# Patient Record
Sex: FEMALE | URBAN - METROPOLITAN AREA
[De-identification: names, ages, dates, MRNs, and addresses within clinical notes are randomized per-mention and may not be internally consistent; named-entity substitution may affect disease eponyms.]

---

## 2020-08-12 ENCOUNTER — INPATIENT (INPATIENT)
Facility: HOSPITAL | Age: 70
LOS: 0 days | Discharge: HOME | DRG: 286 | End: 2020-08-12
Attending: INTERNAL MEDICINE | Admitting: INTERNAL MEDICINE
Payer: COMMERCIAL

## 2020-08-12 VITALS
RESPIRATION RATE: 17 BRPM | OXYGEN SATURATION: 97 % | SYSTOLIC BLOOD PRESSURE: 133 MMHG | HEART RATE: 96 BPM | DIASTOLIC BLOOD PRESSURE: 72 MMHG

## 2020-08-12 VITALS
SYSTOLIC BLOOD PRESSURE: 138 MMHG | TEMPERATURE: 98 F | HEART RATE: 68 BPM | HEIGHT: 60 IN | DIASTOLIC BLOOD PRESSURE: 68 MMHG | RESPIRATION RATE: 17 BRPM | OXYGEN SATURATION: 98 % | WEIGHT: 147.05 LBS

## 2020-08-12 DIAGNOSIS — R07.9 CHEST PAIN, UNSPECIFIED: ICD-10-CM

## 2020-08-12 DIAGNOSIS — E78.5 HYPERLIPIDEMIA, UNSPECIFIED: ICD-10-CM

## 2020-08-12 PROCEDURE — 93010 ELECTROCARDIOGRAM REPORT: CPT

## 2020-08-12 PROCEDURE — C1769: CPT

## 2020-08-12 PROCEDURE — 93458 L HRT ARTERY/VENTRICLE ANGIO: CPT

## 2020-08-12 PROCEDURE — C1887: CPT

## 2020-08-12 PROCEDURE — 93005 ELECTROCARDIOGRAM TRACING: CPT

## 2020-08-12 PROCEDURE — C1894: CPT

## 2020-08-12 PROCEDURE — 93458 L HRT ARTERY/VENTRICLE ANGIO: CPT | Mod: 26

## 2020-08-12 RX ORDER — AMLODIPINE BESYLATE 2.5 MG/1
1 TABLET ORAL
Qty: 0 | Refills: 0 | DISCHARGE

## 2020-08-12 RX ORDER — FLUTICASONE PROPIONATE 50 MCG
1 SPRAY, SUSPENSION NASAL
Qty: 0 | Refills: 0 | DISCHARGE

## 2020-08-12 RX ORDER — AMLODIPINE BESYLATE 2.5 MG/1
1 TABLET ORAL
Qty: 30 | Refills: 0
Start: 2020-08-12 | End: 2020-09-10

## 2020-08-12 RX ORDER — ATORVASTATIN CALCIUM 80 MG/1
1 TABLET, FILM COATED ORAL
Qty: 0 | Refills: 0 | DISCHARGE

## 2020-08-12 RX ORDER — ASPIRIN/CALCIUM CARB/MAGNESIUM 324 MG
81 TABLET ORAL DAILY
Refills: 0 | Status: DISCONTINUED | OUTPATIENT
Start: 2020-08-12 | End: 2020-08-12

## 2020-08-12 RX ORDER — ATORVASTATIN CALCIUM 80 MG/1
40 TABLET, FILM COATED ORAL AT BEDTIME
Refills: 0 | Status: DISCONTINUED | OUTPATIENT
Start: 2020-08-12 | End: 2020-08-12

## 2020-08-12 RX ORDER — CLOPIDOGREL BISULFATE 75 MG/1
1 TABLET, FILM COATED ORAL
Qty: 0 | Refills: 0 | DISCHARGE

## 2020-08-12 RX ORDER — MECLIZINE HCL 12.5 MG
12.5 TABLET ORAL THREE TIMES A DAY
Refills: 0 | Status: DISCONTINUED | OUTPATIENT
Start: 2020-08-12 | End: 2020-08-12

## 2020-08-12 RX ORDER — DIPHENHYDRAMINE HCL 50 MG
1 CAPSULE ORAL
Qty: 0 | Refills: 0 | DISCHARGE

## 2020-08-12 RX ORDER — ASPIRIN/CALCIUM CARB/MAGNESIUM 324 MG
81 TABLET ORAL
Qty: 0 | Refills: 0 | DISCHARGE

## 2020-08-12 RX ORDER — ENOXAPARIN SODIUM 100 MG/ML
40 INJECTION SUBCUTANEOUS
Qty: 0 | Refills: 0 | DISCHARGE

## 2020-08-12 RX ORDER — MECLIZINE HCL 12.5 MG
1 TABLET ORAL
Qty: 0 | Refills: 0 | DISCHARGE

## 2020-08-12 RX ORDER — AMLODIPINE BESYLATE 2.5 MG/1
10 TABLET ORAL DAILY
Refills: 0 | Status: DISCONTINUED | OUTPATIENT
Start: 2020-08-12 | End: 2020-08-12

## 2020-08-12 RX ORDER — ATORVASTATIN CALCIUM 80 MG/1
1 TABLET, FILM COATED ORAL
Qty: 30 | Refills: 0
Start: 2020-08-12 | End: 2020-09-10

## 2020-08-12 RX ORDER — ASPIRIN/CALCIUM CARB/MAGNESIUM 324 MG
1 TABLET ORAL
Qty: 30 | Refills: 0
Start: 2020-08-12 | End: 2020-09-10

## 2020-08-12 RX ORDER — HYDROCHLOROTHIAZIDE 25 MG
25 TABLET ORAL DAILY
Refills: 0 | Status: DISCONTINUED | OUTPATIENT
Start: 2020-08-12 | End: 2020-08-12

## 2020-08-12 RX ADMIN — AMLODIPINE BESYLATE 10 MILLIGRAM(S): 2.5 TABLET ORAL at 14:26

## 2020-08-12 RX ADMIN — Medication 25 MILLIGRAM(S): at 14:26

## 2020-08-12 NOTE — H&P CARDIOLOGY - FAMILY HISTORY
Mother  Still living? No  Family history of cerebrovascular accident (CVA) in mother, Age at diagnosis: Age Unknown

## 2020-08-12 NOTE — H&P CARDIOLOGY - HISTORY OF PRESENT ILLNESS
70 year old female h/o HTN, HLD who presented to [facility] 8/6, ruled out and was d/c with outpatient follow up and then returned 8/8 for chest pain on/off x 7 days with exercision, +palps, +SOB, + dizziness , + reproducible who had one brief episode of syncope 8/8/2020 (CT head neg for bleed, CTA head/neck no significant stenosis, MRI: Chiari I, EEG: WNL, per neuro likely vasovagal as lasted seconds while standing up cooking, then sat down and brief LOC). Pt had + stress test 8/11/20  and transferred for [facility] today.     8/11: plavix 75mg, 8/12 ASA 81 mg, wbc 5.59, plts 229 h/h 12.2/38.5, bun/cr 16/1.07, eGRF > 60    Symptoms:        Angina (Class): II       Ischemic Symptoms: chest pain    Heart Failure: NONE       Systolic/Diastolic/Combined:        NYHA Class (within 2 weeks):     Assessment of LVEF:       EF: 60% NO WMA       Assessed by: TTE       Date: 8/6/20    Prior Cardiac Interventions: NONE       PCI's:        CABG:     Noninvasive Testing:   Stress Test: Date: 8/11/2020       Protocol: PEDRO       Duration of Exercise:        Symptoms: dyspnea       EKG Changes: deep T wave inversion inferiorly and laterally       DTS:        Myocardial Imaging: decrease in LV, TID, reversible anterior wall perfusion defect       Risk Assessment: HTN    Echo: 8/6/2020    Antianginal Therapies: nnone       Beta Blockers:  none       Calcium Channel Blockers: norvasc       Long Acting Nitrates: none       Ranexa: none    Associated Risk Factors:        Cerebrovascular Disease: N/A       Chronic Lung Disease: N/A       Peripheral Arterial Disease: N/A       Chronic Kidney Disease (if yes, what is GFR): N/A       Uncontrolled Diabetes (if yes, what is HgbA1C or FBS): N/A       Poorly Controlled Hypertension (if yes, what is SBP): N/A       Morbid Obesity (if yes, what is BMI): N/A       History of Recent Ventricular Arrhythmia: N/A       Inability to Ambulate Safely: N/A       Need for Therapeutic Anticoagulation: N/A       Antiplatelet or Contrast Allergy: N/A 70 year old female no implantable devices, COVID neg,  h/o HTN, HLD who presented to [facility] 8/6, ruled out and was d/c with outpatient follow up and then returned 8/8 for chest pain on/off x 7 days with exercision, +palps, +SOB, + dizziness , + reproducible who had one brief episode of syncope 8/8/2020 (CT head neg for bleed, CTA head/neck no significant stenosis, MRI: Chiari I, EEG: WNL, per neuro likely vasovagal as lasted seconds while standing up cooking, then sat down and brief LOC). Pt had + stress test 8/11/20  and transferred for Left Heart Cath today.    8/11: plavix 75mg, 8/12 ASA 81 mg, wbc 5.59, plts 229 h/h 12.2/38.5, bun/cr 16/1.07, eGRF > 60    Symptoms:        Angina (Class): II       Ischemic Symptoms: chest pain    Heart Failure: NONE       Systolic/Diastolic/Combined:        NYHA Class (within 2 weeks):     Assessment of LVEF:       EF: 60% NO WMA       Assessed by: TTE       Date: 8/6/20    Prior Cardiac Interventions: NONE       PCI's:        CABG:     Noninvasive Testing:   Stress Test: Date: 8/11/2020       Protocol: PEDRO       Duration of Exercise:        Symptoms: dyspnea       EKG Changes: deep T wave inversion inferiorly and laterally       DTS:        Myocardial Imaging: decrease in LV, TID, reversible anterior wall perfusion defect       Risk Assessment: HTN    Echo: 8/6/2020    Antianginal Therapies: nnone       Beta Blockers:  none       Calcium Channel Blockers: norvasc       Long Acting Nitrates: none       Ranexa: none    Associated Risk Factors:        Cerebrovascular Disease: N/A       Chronic Lung Disease: N/A       Peripheral Arterial Disease: N/A       Chronic Kidney Disease (if yes, what is GFR): N/A       Uncontrolled Diabetes (if yes, what is HgbA1C or FBS): N/A       Poorly Controlled Hypertension (if yes, what is SBP): N/A       Morbid Obesity (if yes, what is BMI): N/A       History of Recent Ventricular Arrhythmia: N/A       Inability to Ambulate Safely: N/A       Need for Therapeutic Anticoagulation: N/A       Antiplatelet or Contrast Allergy: N/A

## 2020-08-12 NOTE — DISCHARGE NOTE PROVIDER - CARE PROVIDER_API CALL
Dr. Caitlyn Toribio MD,   Phone: (   )    -  Fax: (   )    -  Established Patient  Follow Up Time: 2 weeks

## 2020-08-12 NOTE — DISCHARGE NOTE PROVIDER - HOSPITAL COURSE
HPI:    70 year old female no implantable devices, COVID neg,  h/o HTN, HLD who presented to [facility] 8/6, ruled out and was d/c with outpatient follow up and then returned 8/8 for chest pain on/off x 7 days with exercision, +palps, +SOB, + dizziness , + reproducible who had one brief episode of syncope 8/8/2020 (CT head neg for bleed, CTA head/neck no significant stenosis, MRI: Chiari I, EEG: WNL, per neuro likely vasovagal as lasted seconds while standing up cooking, then sat down and brief LOC). Pt had + stress test 8/11/20  and transferred for Left Heart Cath today.        8/11: plavix 75mg, 8/12 ASA 81 mg, wbc 5.59, plts 229 h/h 12.2/38.5, bun/cr 16/1.07, eGRF > 60        Symptoms:          Angina (Class): II         Ischemic Symptoms: chest pain        Heart Failure: NONE         Systolic/Diastolic/Combined:          NYHA Class (within 2 weeks):         Assessment of LVEF:         EF: 60% NO WMA         Assessed by: TTE         Date: 8/6/20        Prior Cardiac Interventions: NONE         PCI's:          CABG:         Noninvasive Testing:     Stress Test: Date: 8/11/2020         Protocol: PEDRO         Duration of Exercise:          Symptoms: dyspnea         EKG Changes: deep T wave inversion inferiorly and laterally         DTS:          Myocardial Imaging: decrease in LV, TID, reversible anterior wall perfusion defect         Risk Assessment: HTN        Echo: 8/6/2020        Antianginal Therapies: nnone         Beta Blockers:  none         Calcium Channel Blockers: norvasc         Long Acting Nitrates: none         Ranexa: none        Associated Risk Factors:          Cerebrovascular Disease: N/A         Chronic Lung Disease: N/A         Peripheral Arterial Disease: N/A         Chronic Kidney Disease (if yes, what is GFR): N/A         Uncontrolled Diabetes (if yes, what is HgbA1C or FBS): N/A         Poorly Controlled Hypertension (if yes, what is SBP): N/A         Morbid Obesity (if yes, what is BMI): N/A         History of Recent Ventricular Arrhythmia: N/A         Inability to Ambulate Safely: N/A         Need for Therapeutic Anticoagulation: N/A         Antiplatelet or Contrast Allergy: N/A (12 Aug 2020 11:23)        8/12/2020 left heart cath revealed normal coronary arteries, access via right radial artery. Site without bleeding of hematoma.

## 2020-08-12 NOTE — DISCHARGE NOTE NURSING/CASE MANAGEMENT/SOCIAL WORK - PATIENT PORTAL LINK FT
You can access the FollowMyHealth Patient Portal offered by [facility] by registering at the following website: [email or web address]. By joining [employer]’s FollowMyHealth portal, you will also be able to view your health information using other applications (apps) compatible with our system.

## 2020-08-12 NOTE — DISCHARGE NOTE PROVIDER - NSDCCPCAREPLAN_GEN_ALL_CORE_FT
PRINCIPAL DISCHARGE DIAGNOSIS  Diagnosis: Chest pain  Assessment and Plan of Treatment: see instruction sheets provided        SECONDARY DISCHARGE DIAGNOSES  Diagnosis: HTN (hypertension)  Assessment and Plan of Treatment: Continue with your blood pressure medications; eat a heart healthy diet with low salt diet; exercise regularly (consult with your physician or cardiologist first); maintain a heart healthy weight; if you smoke - quit (A resource to help you stop smoking is the [facility] – phone number [phone].); include healthy ways to manage stress. Continue to follow with your primary care physician or cardiologist.

## 2020-08-12 NOTE — H&P CARDIOLOGY - PMH
Chiari I malformation  found on MRI 8/11/2020  HLD (hyperlipidemia)    HTN (hypertension)    Thyroid nodule  found on mri 8/11: no intervention needed per [facility]

## 2020-08-12 NOTE — DISCHARGE NOTE PROVIDER - PROVIDER TOKENS
FREE:[LAST:[Dr. Caitlyn Toribio MD],PHONE:[(   )    -],FAX:[(   )    -],FOLLOWUP:[2 weeks],ESTABLISHEDPATIENT:[T]]

## 2020-08-12 NOTE — DISCHARGE NOTE PROVIDER - NSDCMRMEDTOKEN_GEN_ALL_CORE_FT
amLODIPine 10 mg oral tablet: 1 tab(s) orally once a day  atorvastatin 40 mg oral tablet: 1 tab(s) orally once a day (at bedtime)  fluticasone 50 mcg/inh nasal spray: 1 spray(s) nasal once a day EACH NOSTRIL  HOME  hydroCHLOROthiazide 25 mg oral tablet: 1 tab(s) orally once a day  meclizine 12.5 mg oral tablet: 1 tab(s) orally 3 times a day, As Needed for dizziness HOME